# Patient Record
Sex: MALE | Race: WHITE | NOT HISPANIC OR LATINO | URBAN - METROPOLITAN AREA
[De-identification: names, ages, dates, MRNs, and addresses within clinical notes are randomized per-mention and may not be internally consistent; named-entity substitution may affect disease eponyms.]

---

## 2019-12-31 ENCOUNTER — APPOINTMENT (RX ONLY)
Dept: URBAN - METROPOLITAN AREA CLINIC 139 | Facility: CLINIC | Age: 59
Setting detail: DERMATOLOGY
End: 2019-12-31

## 2019-12-31 DIAGNOSIS — I78.8 OTHER DISEASES OF CAPILLARIES: ICD-10-CM

## 2019-12-31 PROCEDURE — 17106 DSTR CUT VSC PRLF LES<10SQCM: CPT

## 2019-12-31 PROCEDURE — ? COUNSELING

## 2019-12-31 PROCEDURE — ? LASER VASCULAR LESION

## 2019-12-31 ASSESSMENT — LOCATION SIMPLE DESCRIPTION DERM: LOCATION SIMPLE: RIGHT CHEEK

## 2019-12-31 ASSESSMENT — LOCATION ZONE DERM: LOCATION ZONE: FACE

## 2019-12-31 ASSESSMENT — LOCATION DETAILED DESCRIPTION DERM: LOCATION DETAILED: RIGHT CENTRAL MALAR CHEEK

## 2019-12-31 NOTE — PROCEDURE: LASER VASCULAR LESION
Fluence: 8
Show Topical Anesthesia: Yes
Include Z78.9 (Other Specified Conditions Influencing Health Status) As An Associated Diagnosis?: No
Anesthesia Type: 1% lidocaine with epinephrine
Total Pulses: 3
Laser Type: Jeremiah 50 CO2 Laser
Power (Guerrier-Leave At Zero If Unwanted): 40
Laser Type: Pulse dye laser 595nm
External Cooling Fan Speed: 0
Passes: several
Cryogen Time (Ms): 30
Consent: Written consent obtained, risks reviewed including but not limited to crusting, scabbing, blistering, scarring, darker or lighter pigmentary change, incidental hair removal, bruising, and/or incomplete removal.
Immediate Endpoint: erythema
Delay Time (Ms): 20
Post-Care Instructions: I reviewed with the patient in detail post-care instructions. Patient should stay away from the sun and wear sun protection until treated areas are fully healed.
Laser Mode: Fractionated
Pulse Duration: 3 ms
Medical Necessity Clause: This procedure was medically necessary because the lesions that were treated were:
Spot Size: 7 mm
Energy(Mj-Leave At Zero If Unwanted): 500
Medical Necessity Information: It is in your best interest to select a reason for this procedure from the list below. All of these items fulfill various CMS LCD requirements except the new and changing color options.
Wavelength: 10,600nm
Post Procedure Text: Vaseline and ice applied. Post care reviewed with patient.
Spot Sizes: 3mm
Detail Level: Zone
Feathering Statement: Following the treatment the wound edges were feathered using 260mJ.
Square Area Of Lesion: 7

## 2020-01-27 ENCOUNTER — APPOINTMENT (RX ONLY)
Dept: URBAN - METROPOLITAN AREA CLINIC 139 | Facility: CLINIC | Age: 60
Setting detail: DERMATOLOGY
End: 2020-01-27

## 2020-01-27 DIAGNOSIS — I78.8 OTHER DISEASES OF CAPILLARIES: ICD-10-CM | Status: IMPROVED

## 2020-01-27 PROCEDURE — 17107 DSTR CUT VSC PRLF LES10-50SQ: CPT | Mod: 79

## 2020-01-27 PROCEDURE — ? LASER VASCULAR LESION

## 2020-01-27 PROCEDURE — ? COUNSELING

## 2020-01-27 ASSESSMENT — LOCATION SIMPLE DESCRIPTION DERM: LOCATION SIMPLE: RIGHT CHEEK

## 2020-01-27 ASSESSMENT — LOCATION DETAILED DESCRIPTION DERM: LOCATION DETAILED: RIGHT INFERIOR CENTRAL MALAR CHEEK

## 2020-01-27 ASSESSMENT — LOCATION ZONE DERM: LOCATION ZONE: FACE

## 2020-01-27 NOTE — PROCEDURE: LASER VASCULAR LESION
Fluence: 7.5
Laser Type: Crumrod 50 CO2 Laser
Total Pulses: 50
Include Z78.9 (Other Specified Conditions Influencing Health Status) As An Associated Diagnosis?: No
Wavelength: 10,600nm
Anesthesia Type: 1% lidocaine with epinephrine
Show Topical Anesthesia: Yes
Cryogen Time (Ms): 30
Immediate Endpoint: erythema
Power (Guerrier-Leave At Zero If Unwanted): 40
Post-Care Instructions: I reviewed with the patient in detail post-care instructions. Patient should stay away from the sun and wear sun protection until treated areas are fully healed.
External Cooling Fan Speed: 0
Consent: Written consent obtained, risks reviewed including but not limited to crusting, scabbing, blistering, scarring, darker or lighter pigmentary change, incidental hair removal, bruising, and/or incomplete removal.
Passes: several
Laser Type: Vbeam 595nm
Laser Mode: Fractionated
Spot Size: 7 mm
Energy(Mj-Leave At Zero If Unwanted): 500
Medical Necessity Clause: This procedure was medically necessary because the lesions that were treated were:
Delay Time (Ms): 20
Feathering Statement: Following the treatment the wound edges were feathered using 260mJ.
Detail Level: Zone
Medical Necessity Information: It is in your best interest to select a reason for this procedure from the list below. All of these items fulfill various CMS LCD requirements except the new and changing color options.
Pulse Duration: 3 ms
Spot Sizes: 3mm
Post Procedure Text: Vaseline and ice applied. Post care reviewed with patient.

## 2020-02-26 ENCOUNTER — APPOINTMENT (RX ONLY)
Dept: URBAN - METROPOLITAN AREA CLINIC 139 | Facility: CLINIC | Age: 60
Setting detail: DERMATOLOGY
End: 2020-02-26

## 2020-02-26 DIAGNOSIS — I78.8 OTHER DISEASES OF CAPILLARIES: ICD-10-CM

## 2020-02-26 PROCEDURE — ? LASER VASCULAR LESION

## 2020-02-26 PROCEDURE — 17107 DSTR CUT VSC PRLF LES10-50SQ: CPT | Mod: 79

## 2020-02-26 PROCEDURE — ? COUNSELING

## 2020-02-26 ASSESSMENT — LOCATION DETAILED DESCRIPTION DERM: LOCATION DETAILED: RIGHT LOWER CUTANEOUS LIP

## 2020-02-26 ASSESSMENT — LOCATION ZONE DERM: LOCATION ZONE: LIP

## 2020-02-26 ASSESSMENT — LOCATION SIMPLE DESCRIPTION DERM: LOCATION SIMPLE: RIGHT LIP

## 2022-08-17 NOTE — PROCEDURE: LASER VASCULAR LESION
I spoke with Amanda/Dr. Joaquin's office 08/16/22, I answered her questions.  Referral for procedure placed by Dr. Joaquin's office.  Nothing is required from the pcp at this time-lb  
Show Topical Anesthesia: Yes
Post Procedure Text: Vaseline and ice applied. Post care reviewed with patient.
Passes: several
Post-Care Instructions: I reviewed with the patient in detail post-care instructions. Patient should stay away from the sun and wear sun protection until treated areas are fully healed.
Power (Guerrier-Leave At Zero If Unwanted): 40
Pulse Duration: 3 ms
Cryogen Time (Ms): 30
Anesthesia Type: 1% lidocaine with epinephrine
Pulse Duration (Usec): 0
Feathering Statement: Following the treatment the wound edges were feathered using 260mJ.
Spot Size: 7 mm
Wavelength: 10,600nm
Immediate Endpoint: erythema
Spot Sizes: 3mm
Laser Mode: Fractionated
Include Z78.9 (Other Specified Conditions Influencing Health Status) As An Associated Diagnosis?: No
Consent: Written consent obtained, risks reviewed including but not limited to crusting, scabbing, blistering, scarring, darker or lighter pigmentary change, incidental hair removal, bruising, and/or incomplete removal.
Medical Necessity Information: It is in your best interest to select a reason for this procedure from the list below. All of these items fulfill various CMS LCD requirements except the new and changing color options.
Total Pulses: 50
Fluence: 7.5
Medical Necessity Clause: This procedure was medically necessary because the lesions that were treated were:
Detail Level: Zone
Laser Type: Pitcher 50 CO2 Laser
Energy(Mj-Leave At Zero If Unwanted): 500
Laser Type: Vbeam 595nm
Delay Time (Ms): 20
Square Area Of Lesion: 10